# Patient Record
Sex: FEMALE | Race: ASIAN | NOT HISPANIC OR LATINO
[De-identification: names, ages, dates, MRNs, and addresses within clinical notes are randomized per-mention and may not be internally consistent; named-entity substitution may affect disease eponyms.]

---

## 2017-01-11 ENCOUNTER — APPOINTMENT (OUTPATIENT)
Dept: CARDIOLOGY | Facility: CLINIC | Age: 67
End: 2017-01-11

## 2017-01-11 VITALS
WEIGHT: 130 LBS | HEART RATE: 82 BPM | SYSTOLIC BLOOD PRESSURE: 125 MMHG | HEIGHT: 63 IN | RESPIRATION RATE: 18 BRPM | DIASTOLIC BLOOD PRESSURE: 61 MMHG | BODY MASS INDEX: 23.04 KG/M2

## 2017-01-17 ENCOUNTER — APPOINTMENT (OUTPATIENT)
Dept: CARDIOLOGY | Facility: CLINIC | Age: 67
End: 2017-01-17

## 2017-01-25 ENCOUNTER — APPOINTMENT (OUTPATIENT)
Dept: CARDIOLOGY | Facility: CLINIC | Age: 67
End: 2017-01-25

## 2017-01-25 VITALS
RESPIRATION RATE: 14 BRPM | HEIGHT: 62 IN | WEIGHT: 132 LBS | DIASTOLIC BLOOD PRESSURE: 77 MMHG | OXYGEN SATURATION: 98 % | HEART RATE: 75 BPM | BODY MASS INDEX: 24.29 KG/M2 | SYSTOLIC BLOOD PRESSURE: 134 MMHG

## 2017-03-15 ENCOUNTER — APPOINTMENT (OUTPATIENT)
Dept: CARDIOLOGY | Facility: CLINIC | Age: 67
End: 2017-03-15

## 2017-04-06 ENCOUNTER — APPOINTMENT (OUTPATIENT)
Dept: CARDIOLOGY | Facility: CLINIC | Age: 67
End: 2017-04-06

## 2017-04-06 VITALS
HEIGHT: 64 IN | SYSTOLIC BLOOD PRESSURE: 129 MMHG | OXYGEN SATURATION: 92 % | DIASTOLIC BLOOD PRESSURE: 70 MMHG | HEART RATE: 85 BPM | RESPIRATION RATE: 14 BRPM | TEMPERATURE: 98.3 F | BODY MASS INDEX: 23.39 KG/M2 | WEIGHT: 137 LBS

## 2017-05-24 ENCOUNTER — APPOINTMENT (OUTPATIENT)
Dept: CARDIOLOGY | Facility: CLINIC | Age: 67
End: 2017-05-24

## 2017-05-24 VITALS
HEIGHT: 64 IN | RESPIRATION RATE: 15 BRPM | HEART RATE: 86 BPM | DIASTOLIC BLOOD PRESSURE: 65 MMHG | WEIGHT: 131 LBS | SYSTOLIC BLOOD PRESSURE: 131 MMHG | BODY MASS INDEX: 22.36 KG/M2

## 2017-05-24 RX ORDER — AMMONIUM LACTATE 12 %
12 CREAM (GRAM) TOPICAL
Qty: 385 | Refills: 0 | Status: ACTIVE | COMMUNITY
Start: 2017-01-04

## 2017-05-24 RX ORDER — DEXLANSOPRAZOLE 30 MG/1
30 CAPSULE, DELAYED RELEASE ORAL
Qty: 30 | Refills: 0 | Status: ACTIVE | COMMUNITY
Start: 2017-01-24

## 2017-05-24 RX ORDER — LIDOCAINE 5 G/100G
5 OINTMENT TOPICAL
Qty: 100 | Refills: 0 | Status: ACTIVE | COMMUNITY
Start: 2017-01-04

## 2017-05-24 RX ORDER — DEXAMETHASONE 0.5 MG/5ML
0.5 ELIXIR ORAL
Qty: 474 | Refills: 0 | Status: ACTIVE | COMMUNITY
Start: 2017-03-24

## 2017-05-24 RX ORDER — ERYTHROMYCIN 5 MG/G
5 OINTMENT OPHTHALMIC
Qty: 4 | Refills: 0 | Status: ACTIVE | COMMUNITY
Start: 2017-01-18

## 2017-05-24 RX ORDER — BUDESONIDE AND FORMOTEROL FUMARATE DIHYDRATE 160; 4.5 UG/1; UG/1
160-4.5 AEROSOL RESPIRATORY (INHALATION)
Qty: 10 | Refills: 0 | Status: ACTIVE | COMMUNITY
Start: 2017-05-16

## 2017-05-24 RX ORDER — MONTELUKAST 10 MG/1
10 TABLET, FILM COATED ORAL
Qty: 30 | Refills: 0 | Status: ACTIVE | COMMUNITY
Start: 2017-05-16

## 2017-05-24 RX ORDER — BIMATOPROST 0.1 MG/ML
0.01 SOLUTION/ DROPS OPHTHALMIC
Qty: 2 | Refills: 0 | Status: ACTIVE | COMMUNITY
Start: 2017-01-18

## 2017-08-10 ENCOUNTER — APPOINTMENT (OUTPATIENT)
Dept: CARDIOLOGY | Facility: CLINIC | Age: 67
End: 2017-08-10
Payer: MEDICARE

## 2017-08-10 ENCOUNTER — NON-APPOINTMENT (OUTPATIENT)
Age: 67
End: 2017-08-10

## 2017-08-10 VITALS
TEMPERATURE: 98.7 F | OXYGEN SATURATION: 95 % | BODY MASS INDEX: 22.49 KG/M2 | SYSTOLIC BLOOD PRESSURE: 138 MMHG | WEIGHT: 131 LBS | DIASTOLIC BLOOD PRESSURE: 58 MMHG | RESPIRATION RATE: 15 BRPM | HEART RATE: 71 BPM

## 2017-08-10 PROCEDURE — 93000 ELECTROCARDIOGRAM COMPLETE: CPT

## 2017-08-10 PROCEDURE — 99496 TRANSJ CARE MGMT HIGH F2F 7D: CPT

## 2017-08-10 RX ORDER — PANCRELIPASE 36000; 180000; 114000 [USP'U]/1; [USP'U]/1; [USP'U]/1
36000-114000 CAPSULE, DELAYED RELEASE PELLETS ORAL
Qty: 100 | Refills: 0 | Status: ACTIVE | COMMUNITY
Start: 2017-06-26

## 2017-08-10 RX ORDER — ATORVASTATIN CALCIUM 10 MG/1
10 TABLET, FILM COATED ORAL
Qty: 30 | Refills: 0 | Status: ACTIVE | COMMUNITY
Start: 2017-06-22

## 2017-08-10 RX ORDER — PANTOPRAZOLE 40 MG/1
40 TABLET, DELAYED RELEASE ORAL
Qty: 30 | Refills: 0 | Status: ACTIVE | COMMUNITY
Start: 2017-08-07

## 2017-08-10 RX ORDER — LEVOFLOXACIN 500 MG/1
500 TABLET, FILM COATED ORAL
Qty: 6 | Refills: 0 | Status: ACTIVE | COMMUNITY
Start: 2017-08-07

## 2017-09-27 ENCOUNTER — APPOINTMENT (OUTPATIENT)
Dept: CARDIOLOGY | Facility: CLINIC | Age: 67
End: 2017-09-27
Payer: MEDICARE

## 2017-09-27 VITALS
WEIGHT: 130 LBS | DIASTOLIC BLOOD PRESSURE: 58 MMHG | SYSTOLIC BLOOD PRESSURE: 144 MMHG | HEART RATE: 67 BPM | RESPIRATION RATE: 12 BRPM | HEIGHT: 64 IN | BODY MASS INDEX: 22.2 KG/M2

## 2017-09-27 PROCEDURE — 99364: CPT

## 2017-09-27 PROCEDURE — 99215 OFFICE O/P EST HI 40 MIN: CPT

## 2017-09-27 PROCEDURE — 93000 ELECTROCARDIOGRAM COMPLETE: CPT

## 2017-12-21 ENCOUNTER — APPOINTMENT (OUTPATIENT)
Dept: CARDIOLOGY | Facility: CLINIC | Age: 67
End: 2017-12-21

## 2018-01-04 ENCOUNTER — APPOINTMENT (OUTPATIENT)
Dept: CARDIOLOGY | Facility: CLINIC | Age: 68
End: 2018-01-04

## 2018-01-10 ENCOUNTER — APPOINTMENT (OUTPATIENT)
Dept: CARDIOLOGY | Facility: CLINIC | Age: 68
End: 2018-01-10
Payer: MEDICARE

## 2018-01-10 VITALS
WEIGHT: 132 LBS | SYSTOLIC BLOOD PRESSURE: 132 MMHG | BODY MASS INDEX: 22.53 KG/M2 | HEART RATE: 72 BPM | DIASTOLIC BLOOD PRESSURE: 65 MMHG | RESPIRATION RATE: 15 BRPM | HEIGHT: 64 IN

## 2018-01-10 PROCEDURE — 99215 OFFICE O/P EST HI 40 MIN: CPT

## 2018-01-10 RX ORDER — ENOXAPARIN SODIUM 100 MG/ML
80 INJECTION SUBCUTANEOUS
Qty: 16 | Refills: 0 | Status: ACTIVE | COMMUNITY
Start: 2017-12-07

## 2018-01-10 RX ORDER — WARFARIN 4 MG/1
4 TABLET ORAL
Qty: 7 | Refills: 0 | Status: ACTIVE | COMMUNITY
Start: 2017-12-07

## 2018-01-25 ENCOUNTER — APPOINTMENT (OUTPATIENT)
Dept: CARDIOLOGY | Facility: CLINIC | Age: 68
End: 2018-01-25

## 2018-02-01 ENCOUNTER — APPOINTMENT (OUTPATIENT)
Dept: CARDIOLOGY | Facility: CLINIC | Age: 68
End: 2018-02-01
Payer: MEDICARE

## 2018-02-01 ENCOUNTER — NON-APPOINTMENT (OUTPATIENT)
Age: 68
End: 2018-02-01

## 2018-02-01 VITALS
SYSTOLIC BLOOD PRESSURE: 148 MMHG | HEART RATE: 75 BPM | TEMPERATURE: 98.1 F | DIASTOLIC BLOOD PRESSURE: 69 MMHG | OXYGEN SATURATION: 94 % | WEIGHT: 132 LBS | RESPIRATION RATE: 17 BRPM | BODY MASS INDEX: 22.66 KG/M2

## 2018-02-01 PROCEDURE — 93000 ELECTROCARDIOGRAM COMPLETE: CPT

## 2018-02-01 PROCEDURE — 99497 ADVNCD CARE PLAN 30 MIN: CPT

## 2018-02-01 PROCEDURE — 99214 OFFICE O/P EST MOD 30 MIN: CPT

## 2018-02-01 RX ORDER — AMIODARONE HYDROCHLORIDE 200 MG/1
200 TABLET ORAL
Qty: 30 | Refills: 0 | Status: ACTIVE | COMMUNITY
Start: 2018-01-24

## 2018-04-11 ENCOUNTER — APPOINTMENT (OUTPATIENT)
Dept: CARDIOLOGY | Facility: CLINIC | Age: 68
End: 2018-04-11
Payer: MEDICARE

## 2018-04-11 PROCEDURE — 93000 ELECTROCARDIOGRAM COMPLETE: CPT

## 2018-04-11 PROCEDURE — 99214 OFFICE O/P EST MOD 30 MIN: CPT

## 2018-04-12 VITALS
SYSTOLIC BLOOD PRESSURE: 142 MMHG | DIASTOLIC BLOOD PRESSURE: 75 MMHG | RESPIRATION RATE: 15 BRPM | WEIGHT: 130 LBS | HEIGHT: 64 IN | BODY MASS INDEX: 22.2 KG/M2 | HEART RATE: 64 BPM

## 2018-05-09 ENCOUNTER — APPOINTMENT (OUTPATIENT)
Dept: CARDIOLOGY | Facility: CLINIC | Age: 68
End: 2018-05-09
Payer: MEDICARE

## 2018-05-09 VITALS
WEIGHT: 132 LBS | RESPIRATION RATE: 12 BRPM | BODY MASS INDEX: 22.53 KG/M2 | SYSTOLIC BLOOD PRESSURE: 132 MMHG | HEIGHT: 64 IN | HEART RATE: 74 BPM | DIASTOLIC BLOOD PRESSURE: 65 MMHG

## 2018-05-09 PROCEDURE — 99214 OFFICE O/P EST MOD 30 MIN: CPT

## 2018-09-12 ENCOUNTER — APPOINTMENT (OUTPATIENT)
Dept: CARDIOLOGY | Facility: CLINIC | Age: 68
End: 2018-09-12

## 2018-10-17 ENCOUNTER — APPOINTMENT (OUTPATIENT)
Dept: CARDIOLOGY | Facility: CLINIC | Age: 68
End: 2018-10-17

## 2018-11-07 ENCOUNTER — APPOINTMENT (OUTPATIENT)
Dept: CARDIOLOGY | Facility: CLINIC | Age: 68
End: 2018-11-07

## 2018-11-14 ENCOUNTER — APPOINTMENT (OUTPATIENT)
Dept: CARDIOLOGY | Facility: CLINIC | Age: 68
End: 2018-11-14
Payer: MEDICARE

## 2018-11-14 VITALS
DIASTOLIC BLOOD PRESSURE: 78 MMHG | RESPIRATION RATE: 15 BRPM | HEIGHT: 64 IN | SYSTOLIC BLOOD PRESSURE: 112 MMHG | HEART RATE: 78 BPM | WEIGHT: 129 LBS | BODY MASS INDEX: 22.02 KG/M2

## 2018-11-14 PROCEDURE — 93000 ELECTROCARDIOGRAM COMPLETE: CPT

## 2018-11-14 PROCEDURE — 99214 OFFICE O/P EST MOD 30 MIN: CPT

## 2018-11-14 NOTE — PHYSICAL EXAM
[General Appearance - Well Developed] : well developed [Normal Appearance] : normal appearance [Well Groomed] : well groomed [General Appearance - Well Nourished] : well nourished [No Deformities] : no deformities [General Appearance - In No Acute Distress] : no acute distress [Normal Conjunctiva] : the conjunctiva exhibited no abnormalities [Eyelids - No Xanthelasma] : the eyelids demonstrated no xanthelasmas [Normal Oral Mucosa] : normal oral mucosa [No Oral Pallor] : no oral pallor [No Oral Cyanosis] : no oral cyanosis [Normal Jugular Venous A Waves Present] : normal jugular venous A waves present [Normal Jugular Venous V Waves Present] : normal jugular venous V waves present [No Jugular Venous Chirinos A Waves] : no jugular venous chirinos A waves [Respiration, Rhythm And Depth] : normal respiratory rhythm and effort [Exaggerated Use Of Accessory Muscles For Inspiration] : no accessory muscle use [Auscultation Breath Sounds / Voice Sounds] : lungs were clear to auscultation bilaterally [Heart Rate And Rhythm] : heart rate and rhythm were normal [Murmurs] : no murmurs present [Abdomen Soft] : soft [Abdomen Tenderness] : non-tender [Abdomen Mass (___ Cm)] : no abdominal mass palpated [Abnormal Walk] : normal gait [Nail Clubbing] : no clubbing of the fingernails [Cyanosis, Localized] : no localized cyanosis [Petechial Hemorrhages (___cm)] : no petechial hemorrhages [FreeTextEntry1] : trace edema bilaterally  [Skin Color & Pigmentation] : normal skin color and pigmentation [] : no rash [No Venous Stasis] : no venous stasis [Skin Lesions] : no skin lesions [No Skin Ulcers] : no skin ulcer [No Xanthoma] : no  xanthoma was observed [Oriented To Time, Place, And Person] : oriented to person, place, and time [Affect] : the affect was normal [Mood] : the mood was normal [No Anxiety] : not feeling anxious

## 2018-11-14 NOTE — REASON FOR VISIT
[Follow-Up - Clinic] : a clinic follow-up of [Atrial Fibrillation] : atrial fibrillation [Coronary Artery Disease] : coronary artery disease [Hypertension] : hypertension [Prosthetic Valve] : a prosthetic valve [FreeTextEntry1] : 67 F HTN MVR AVR s/p reop bioprosthetic valve TV ring AF for f/u with ATach AF.

## 2018-11-14 NOTE — REVIEW OF SYSTEMS
[Shortness Of Breath] : shortness of breath [Dyspnea on exertion] : dyspnea during exertion [Palpitations] : palpitations [Negative] : Heme/Lymph

## 2018-11-14 NOTE — DISCUSSION/SUMMARY
[FreeTextEntry1] : 67 F HTN hyperlipidemia with AFMVR AVR s/p reop bioprosthetic valve TV ring AF for f/u with ATach AF. \par FOR ASH DCCV with EP.\par Continue AC.\par Rate is controlled. \par Continue meds for HTN.

## 2019-01-16 ENCOUNTER — APPOINTMENT (OUTPATIENT)
Dept: CARDIOLOGY | Facility: CLINIC | Age: 69
End: 2019-01-16

## 2019-02-06 ENCOUNTER — APPOINTMENT (OUTPATIENT)
Dept: CARDIOLOGY | Facility: CLINIC | Age: 69
End: 2019-02-06

## 2019-02-13 ENCOUNTER — APPOINTMENT (OUTPATIENT)
Dept: CARDIOLOGY | Facility: CLINIC | Age: 69
End: 2019-02-13
Payer: MEDICARE

## 2019-02-13 VITALS
HEART RATE: 85 BPM | RESPIRATION RATE: 12 BRPM | SYSTOLIC BLOOD PRESSURE: 116 MMHG | HEIGHT: 64 IN | BODY MASS INDEX: 22.2 KG/M2 | WEIGHT: 130 LBS | DIASTOLIC BLOOD PRESSURE: 52 MMHG

## 2019-02-13 PROCEDURE — 93000 ELECTROCARDIOGRAM COMPLETE: CPT

## 2019-02-13 PROCEDURE — 99214 OFFICE O/P EST MOD 30 MIN: CPT

## 2019-02-13 NOTE — DISCUSSION/SUMMARY
[FreeTextEntry1] : 68 F  HTN hyperlipidemia with AFMVR AVR s/p reop bioprosthetic valve TV ring AF for f/u with ATach AF found to have thrombus MICHELLE on ASH.\par Ablation cancelled for now.\par Continue coumadin and f/u PMD for INR check.\par Continue Pulm f/u.\par Continue meds for HTN and hyperlipidemia.\par

## 2019-02-13 NOTE — HISTORY OF PRESENT ILLNESS
[FreeTextEntry1] : 1. HTN: on medications but taken off BB. She is compliant with medications.\par 2. Hyperlipidemia: on statin, no SE noted. \par 3. AF: she was recently admitted to [facility] for 11 days due to a combination of PNA and AF Aflutter in 2017. She underwent eval by EP for recurrent atrial arrhythmias and underwent DCCV on 1/10/18.She was then noted to be in aflutter with bradycardia. She was then scheduled for ablation but was found to have a MICHELLE thrombus on ASH. She denies CP. \par 4. MVR/ AVR: No new changes. ET is limited by SOB which is chronic.\par

## 2019-02-13 NOTE — REASON FOR VISIT
[Follow-Up - Clinic] : a clinic follow-up of [Atrial Fibrillation] : atrial fibrillation [Hypertension] : hypertension [Pacemaker Evaluation] : pacemaker ~T evaluation ~C was performed [FreeTextEntry1] : 68  F HTN MVR AVR s/p reop bioprosthetic valve TV ring AF for f/u with ATach AF. \par

## 2019-04-03 ENCOUNTER — APPOINTMENT (OUTPATIENT)
Dept: CARDIOLOGY | Facility: CLINIC | Age: 69
End: 2019-04-03

## 2019-07-10 ENCOUNTER — APPOINTMENT (OUTPATIENT)
Dept: CARDIOLOGY | Facility: CLINIC | Age: 69
End: 2019-07-10
Payer: MEDICARE

## 2019-07-10 VITALS
BODY MASS INDEX: 22.36 KG/M2 | SYSTOLIC BLOOD PRESSURE: 126 MMHG | HEIGHT: 64 IN | DIASTOLIC BLOOD PRESSURE: 70 MMHG | HEART RATE: 75 BPM | WEIGHT: 131 LBS | RESPIRATION RATE: 15 BRPM

## 2019-07-10 PROCEDURE — 99214 OFFICE O/P EST MOD 30 MIN: CPT

## 2019-07-10 NOTE — REASON FOR VISIT
[Follow-Up - Clinic] : a clinic follow-up of [Anticoagulation] : anticoagulation [Atrial Fibrillation] : atrial fibrillation [Dyspnea] : dyspnea [Hypertension] : hypertension [Prosthetic Valve] : a prosthetic valve [FreeTextEntry1] : 68 F HTN MVR AVR s/p reop bioprosthetic valve TV ring AF for f/u with ATach AF. \par

## 2019-07-10 NOTE — DISCUSSION/SUMMARY
[FreeTextEntry1] : 68 F HTN hyperlipidemia hx of MVR TVR AF asthma for f/u.\par Continue meds for HTN.\par [place] to monitor valve function.\par F/u Pulm for SOB.\par EP f/u for AF.

## 2019-07-10 NOTE — HISTORY OF PRESENT ILLNESS
[FreeTextEntry1] : 1. HTN: on medications, compliant with medications.\par 2. Hyperlipidemia: on statin, no muscle pain is noted.\par 3. AF s/p DCCV: due to persistent symptoms, she was then scheduled for ablation but was found to have a MICHELLE thrombus on ASH. She denies CP. Her SOB is chronic. Her ET is stable.\par 4. MVR/ AVR: No new changes. ET is limited by SOB which is chronic.\par

## 2019-07-10 NOTE — REVIEW OF SYSTEMS
[Dyspnea on exertion] : dyspnea during exertion [Palpitations] : palpitations [Shortness Of Breath] : shortness of breath [Negative] : Heme/Lymph

## 2019-07-10 NOTE — PHYSICAL EXAM
[General Appearance - Well Developed] : well developed [Normal Appearance] : normal appearance [General Appearance - Well Nourished] : well nourished [Well Groomed] : well groomed [Normal Conjunctiva] : the conjunctiva exhibited no abnormalities [No Deformities] : no deformities [General Appearance - In No Acute Distress] : no acute distress [Eyelids - No Xanthelasma] : the eyelids demonstrated no xanthelasmas [Normal Oral Mucosa] : normal oral mucosa [No Oral Pallor] : no oral pallor [No Oral Cyanosis] : no oral cyanosis [Normal Jugular Venous A Waves Present] : normal jugular venous A waves present [Normal Jugular Venous V Waves Present] : normal jugular venous V waves present [No Jugular Venous Chirinos A Waves] : no jugular venous chirinos A waves [Respiration, Rhythm And Depth] : normal respiratory rhythm and effort [Heart Rate And Rhythm] : heart rate and rhythm were normal [Auscultation Breath Sounds / Voice Sounds] : lungs were clear to auscultation bilaterally [Exaggerated Use Of Accessory Muscles For Inspiration] : no accessory muscle use [Murmurs] : no murmurs present [Abdomen Tenderness] : non-tender [Abdomen Soft] : soft [Abnormal Walk] : normal gait [Abdomen Mass (___ Cm)] : no abdominal mass palpated [Nail Clubbing] : no clubbing of the fingernails [Cyanosis, Localized] : no localized cyanosis [FreeTextEntry1] : trace edema bilaterally  [Petechial Hemorrhages (___cm)] : no petechial hemorrhages [Skin Color & Pigmentation] : normal skin color and pigmentation [] : no rash [No Venous Stasis] : no venous stasis [No Skin Ulcers] : no skin ulcer [Skin Lesions] : no skin lesions [No Xanthoma] : no  xanthoma was observed [Affect] : the affect was normal [Mood] : the mood was normal [Oriented To Time, Place, And Person] : oriented to person, place, and time [No Anxiety] : not feeling anxious

## 2019-09-18 ENCOUNTER — APPOINTMENT (OUTPATIENT)
Dept: CARDIOLOGY | Facility: CLINIC | Age: 69
End: 2019-09-18

## 2020-02-12 ENCOUNTER — APPOINTMENT (OUTPATIENT)
Dept: CARDIOLOGY | Facility: CLINIC | Age: 70
End: 2020-02-12
Payer: MEDICARE

## 2020-02-12 VITALS
SYSTOLIC BLOOD PRESSURE: 132 MMHG | HEIGHT: 64 IN | BODY MASS INDEX: 22.53 KG/M2 | RESPIRATION RATE: 12 BRPM | DIASTOLIC BLOOD PRESSURE: 88 MMHG | HEART RATE: 96 BPM | WEIGHT: 132 LBS

## 2020-02-12 PROCEDURE — 99214 OFFICE O/P EST MOD 30 MIN: CPT

## 2020-02-12 PROCEDURE — 93000 ELECTROCARDIOGRAM COMPLETE: CPT

## 2020-02-12 RX ORDER — CARVEDILOL 12.5 MG/1
12.5 TABLET, FILM COATED ORAL
Refills: 0 | Status: ACTIVE | COMMUNITY
Start: 2020-02-12

## 2020-02-12 RX ORDER — WARFARIN 2 MG/1
2 TABLET ORAL
Qty: 30 | Refills: 0 | Status: DISCONTINUED | COMMUNITY
Start: 2017-08-07 | End: 2020-02-12

## 2020-02-12 RX ORDER — WARFARIN 3 MG/1
3 TABLET ORAL
Refills: 0 | Status: ACTIVE | COMMUNITY
Start: 2020-02-12

## 2020-02-12 NOTE — REASON FOR VISIT
Epidermal Closure: simple interrupted [Follow-Up - Clinic] : a clinic follow-up of [Atrial Fibrillation] : atrial fibrillation [Dyspnea] : dyspnea [Prosthetic Valve] : a prosthetic valve [Hypertension] : hypertension [FreeTextEntry1] : 69 F HTN MVR AVR s/p reop bioprosthetic valve TV ring AF for f/u with ATach AF. \par [phone]\par Dr. Day

## 2020-02-12 NOTE — HISTORY OF PRESENT ILLNESS
[FreeTextEntry1] : 1. HTN: on medications, control improved.\par 2. Hyperlipidemia: on statin, no muscle pain is noted. No SE noted. \par 3. AF s/p DCCV: due to persistent symptoms, she was then scheduled for ablation but was found to have a MICHELLE thrombus on ASH. Patient denies CP.\par She has chronic SOB that is attributed to underlying AF and diastolic dysfunction. She was seen by Pulm. Her echo shows normal LVEF. \par 4. MVR/ AVR: No new changes. ET is limited by SOB which is chronic. See above for results. \par

## 2020-02-12 NOTE — DISCUSSION/SUMMARY
[FreeTextEntry1] : 69 HTN MVR AVR s/p reop bioprosthetic valve TV ring AF for f/u with ATach AF. \par Patient wants to defer echo and holter testing.\par F/u INR. \par Continue coumadin.\par Continue medications for HTN.\par

## 2020-02-12 NOTE — PHYSICAL EXAM
[General Appearance - Well Developed] : well developed [Well Groomed] : well groomed [General Appearance - Well Nourished] : well nourished [Normal Appearance] : normal appearance [General Appearance - In No Acute Distress] : no acute distress [No Deformities] : no deformities [Normal Conjunctiva] : the conjunctiva exhibited no abnormalities [Eyelids - No Xanthelasma] : the eyelids demonstrated no xanthelasmas [Normal Oral Mucosa] : normal oral mucosa [No Oral Pallor] : no oral pallor [Normal Jugular Venous A Waves Present] : normal jugular venous A waves present [No Oral Cyanosis] : no oral cyanosis [Normal Jugular Venous V Waves Present] : normal jugular venous V waves present [No Jugular Venous Chirinos A Waves] : no jugular venous chirinos A waves [Respiration, Rhythm And Depth] : normal respiratory rhythm and effort [Exaggerated Use Of Accessory Muscles For Inspiration] : no accessory muscle use [Heart Rate And Rhythm] : heart rate and rhythm were normal [Auscultation Breath Sounds / Voice Sounds] : lungs were clear to auscultation bilaterally [Murmurs] : no murmurs present [Abdomen Soft] : soft [Abdomen Tenderness] : non-tender [Abnormal Walk] : normal gait [Abdomen Mass (___ Cm)] : no abdominal mass palpated [Nail Clubbing] : no clubbing of the fingernails [Petechial Hemorrhages (___cm)] : no petechial hemorrhages [Cyanosis, Localized] : no localized cyanosis [Skin Color & Pigmentation] : normal skin color and pigmentation [No Venous Stasis] : no venous stasis [] : no rash [Skin Lesions] : no skin lesions [Oriented To Time, Place, And Person] : oriented to person, place, and time [No Skin Ulcers] : no skin ulcer [No Xanthoma] : no  xanthoma was observed [Affect] : the affect was normal [No Anxiety] : not feeling anxious [Mood] : the mood was normal [FreeTextEntry1] : trace edema bilaterally

## 2020-03-11 ENCOUNTER — APPOINTMENT (OUTPATIENT)
Dept: CARDIOLOGY | Facility: CLINIC | Age: 70
End: 2020-03-11

## 2020-07-22 ENCOUNTER — APPOINTMENT (OUTPATIENT)
Dept: CARDIOLOGY | Facility: CLINIC | Age: 70
End: 2020-07-22
Payer: MEDICARE

## 2020-07-22 VITALS
DIASTOLIC BLOOD PRESSURE: 41 MMHG | HEART RATE: 85 BPM | SYSTOLIC BLOOD PRESSURE: 128 MMHG | RESPIRATION RATE: 16 BRPM | HEIGHT: 64 IN | BODY MASS INDEX: 22.36 KG/M2 | WEIGHT: 131 LBS

## 2020-07-22 PROCEDURE — 99214 OFFICE O/P EST MOD 30 MIN: CPT

## 2020-07-22 PROCEDURE — 93000 ELECTROCARDIOGRAM COMPLETE: CPT

## 2020-07-22 NOTE — HISTORY OF PRESENT ILLNESS
[FreeTextEntry1] :  \par 1. HTN: on medications, no SE noted. \par 2. Hyperlipidemia: on statin, lipid profile is followed by PMD. \par 3. AF s/p DCCV: due to persistent symptoms, she was then scheduled for ablation but was found to have a MICHELLE thrombus on ASH. Patient denies CP. She has chronic SOB with exertion. She denies cough or fevers. \par SOB that is attributed to underlying AF and diastolic dysfunction. She was seen by Pulm.  \par 4. MVR/ AVR: No new changes. ET is limited by SOB which is chronic.  She has mild leg edema. She wants to have a colonoscopy. \par \par  \par Cardiology Summary\par \par EK20 AF 84 bpm nl axis ST changes \par

## 2020-07-22 NOTE — PHYSICAL EXAM
[General Appearance - Well Developed] : well developed [Well Groomed] : well groomed [Normal Appearance] : normal appearance [General Appearance - Well Nourished] : well nourished [No Deformities] : no deformities [General Appearance - In No Acute Distress] : no acute distress [Eyelids - No Xanthelasma] : the eyelids demonstrated no xanthelasmas [Normal Oral Mucosa] : normal oral mucosa [Normal Conjunctiva] : the conjunctiva exhibited no abnormalities [No Oral Pallor] : no oral pallor [No Oral Cyanosis] : no oral cyanosis [Normal Jugular Venous A Waves Present] : normal jugular venous A waves present [Normal Jugular Venous V Waves Present] : normal jugular venous V waves present [No Jugular Venous Chirinos A Waves] : no jugular venous chirinos A waves [Respiration, Rhythm And Depth] : normal respiratory rhythm and effort [Exaggerated Use Of Accessory Muscles For Inspiration] : no accessory muscle use [Auscultation Breath Sounds / Voice Sounds] : lungs were clear to auscultation bilaterally [Murmurs] : no murmurs present [Heart Rate And Rhythm] : heart rate and rhythm were normal [Abdomen Tenderness] : non-tender [Abdomen Soft] : soft [Abnormal Walk] : normal gait [Abdomen Mass (___ Cm)] : no abdominal mass palpated [Petechial Hemorrhages (___cm)] : no petechial hemorrhages [Nail Clubbing] : no clubbing of the fingernails [Cyanosis, Localized] : no localized cyanosis [FreeTextEntry1] : trace edema bilaterally  [] : no rash [Skin Color & Pigmentation] : normal skin color and pigmentation [No Skin Ulcers] : no skin ulcer [Skin Lesions] : no skin lesions [No Venous Stasis] : no venous stasis [No Xanthoma] : no  xanthoma was observed [Affect] : the affect was normal [Oriented To Time, Place, And Person] : oriented to person, place, and time [No Anxiety] : not feeling anxious [Mood] : the mood was normal

## 2020-07-22 NOTE — DISCUSSION/SUMMARY
[FreeTextEntry1] : 69 F HTN MVR AVR s/p reop bioprosthetic valve TV ring AF for f/u with ATach AF with SOB.\par CHECK ECHOCARDIOGRAM.\par Continue medications for HTN.\par Continue AC.\par PATIENT CANNOT INTERRUPT AC DUE TO HX OF MICHELLE THROMBUS. IF PATIENT NEEDS COLONOSCOPY, RECOMMEND INPATIENT ADMISSION WITH BRIDGING WITH HEPARIN.

## 2020-07-22 NOTE — REASON FOR VISIT
[Follow-Up - Clinic] : a clinic follow-up of [Atrial Fibrillation] : atrial fibrillation [Hypertension] : hypertension [Prosthetic Valve] : a prosthetic valve [FreeTextEntry1] : 69 F HTN MVR AVR s/p reop bioprosthetic valve TV ring AF for f/u with ATach AF. \par [phone]\par Dr. Day \par  \par

## 2020-07-22 NOTE — REVIEW OF SYSTEMS
[Dyspnea on exertion] : dyspnea during exertion [Shortness Of Breath] : shortness of breath [Palpitations] : palpitations [Negative] : Heme/Lymph

## 2020-07-30 ENCOUNTER — APPOINTMENT (OUTPATIENT)
Dept: CARDIOLOGY | Facility: CLINIC | Age: 70
End: 2020-07-30
Payer: MEDICARE

## 2020-07-30 VITALS
DIASTOLIC BLOOD PRESSURE: 73 MMHG | RESPIRATION RATE: 16 BRPM | OXYGEN SATURATION: 92 % | SYSTOLIC BLOOD PRESSURE: 138 MMHG | TEMPERATURE: 97 F | HEART RATE: 91 BPM

## 2020-07-30 PROCEDURE — 93306 TTE W/DOPPLER COMPLETE: CPT

## 2020-09-02 ENCOUNTER — APPOINTMENT (OUTPATIENT)
Dept: CARDIOLOGY | Facility: CLINIC | Age: 70
End: 2020-09-02
Payer: MEDICARE

## 2020-09-02 VITALS
DIASTOLIC BLOOD PRESSURE: 92 MMHG | RESPIRATION RATE: 12 BRPM | HEART RATE: 78 BPM | SYSTOLIC BLOOD PRESSURE: 112 MMHG | HEIGHT: 64 IN | WEIGHT: 130 LBS | BODY MASS INDEX: 22.2 KG/M2

## 2020-09-02 PROCEDURE — 99214 OFFICE O/P EST MOD 30 MIN: CPT

## 2020-09-02 PROCEDURE — 93000 ELECTROCARDIOGRAM COMPLETE: CPT

## 2020-09-02 NOTE — REASON FOR VISIT
[Follow-Up - Clinic] : a clinic follow-up of [Atrial Fibrillation] : atrial fibrillation [Coronary Artery Disease] : coronary artery disease [Hyperlipidemia] : hyperlipidemia [Hypertension] : hypertension [Prosthetic Valve] : a prosthetic valve [FreeTextEntry1] : 69 F HTN MVR AVR s/p reop bioprosthetic valve TV ring AF for f/u with ATach AF. \par [phone]\par Dr. Day \par  \par

## 2020-09-02 NOTE — HISTORY OF PRESENT ILLNESS
[FreeTextEntry1] :  \par 1. HTN: on medications, controlled. \par 2. Hyperlipidemia: on statin, no muscle pain noted.  \par 3. AF s/p DCCV: due to persistent symptoms, she was then scheduled for ablation but was found to have a MICHELLE thrombus on ASH. Patient denies CP. She has chronic SOB with exertion.  \par SOB that is attributed to underlying AF and diastolic dysfunction. She was seen by Pulm. \par 4. MVR/ AVR: No new changes. ET is limited by SOB which is chronic. She has mild leg edema. She wants to have a colonoscopy. \par She had an echo that showed normal valve function with severe pHTN.\par  \par Cardiology Summary\par \par EK20 AF 84 bpm nl axis ST changes

## 2020-09-02 NOTE — DISCUSSION/SUMMARY
[FreeTextEntry1] : 69 F HTN MVR AVR s/p reop bioprosthetic valve TV ring AF for f/u with ATach AF. \par PATIENT FOUND TO HAVE SEVERE PULMONARY HTN ON ECHO.\par WILL REFER TO PULM.\par Continue medications for HTN.\par Continue to monitor valve function.\par Continue AC.\par Continue rate control.\par

## 2020-09-20 DIAGNOSIS — Z01.818 ENCOUNTER FOR OTHER PREPROCEDURAL EXAMINATION: ICD-10-CM

## 2020-09-21 ENCOUNTER — APPOINTMENT (OUTPATIENT)
Dept: DISASTER EMERGENCY | Facility: CLINIC | Age: 70
End: 2020-09-21

## 2020-09-22 LAB — SARS-COV-2 N GENE NPH QL NAA+PROBE: NOT DETECTED

## 2020-09-25 ENCOUNTER — LABORATORY RESULT (OUTPATIENT)
Age: 70
End: 2020-09-25

## 2020-09-25 ENCOUNTER — APPOINTMENT (OUTPATIENT)
Dept: PULMONOLOGY | Facility: CLINIC | Age: 70
End: 2020-09-25
Payer: MEDICARE

## 2020-09-25 ENCOUNTER — MED ADMIN CHARGE (OUTPATIENT)
Age: 70
End: 2020-09-25

## 2020-09-25 VITALS
HEART RATE: 89 BPM | TEMPERATURE: 97.4 F | HEIGHT: 65 IN | RESPIRATION RATE: 15 BRPM | OXYGEN SATURATION: 90 % | DIASTOLIC BLOOD PRESSURE: 81 MMHG | WEIGHT: 128 LBS | SYSTOLIC BLOOD PRESSURE: 156 MMHG | BODY MASS INDEX: 21.33 KG/M2

## 2020-09-25 VITALS — TEMPERATURE: 98 F | HEIGHT: 65 IN | HEART RATE: 89 BPM | BODY MASS INDEX: 21.33 KG/M2 | WEIGHT: 128 LBS

## 2020-09-25 DIAGNOSIS — I27.20 PULMONARY HYPERTENSION, UNSPECIFIED: ICD-10-CM

## 2020-09-25 LAB
25(OH)D3 SERPL-MCNC: 28.8 NG/ML
A1AT SERPL-MCNC: 154 MG/DL
ALBUMIN SERPL ELPH-MCNC: 4.6 G/DL
ALP BLD-CCNC: 112 U/L
ALT SERPL-CCNC: 24 U/L
ANION GAP SERPL CALC-SCNC: 14 MMOL/L
APTT BLD: 42.5 SEC
AST SERPL-CCNC: 35 U/L
BASOPHILS # BLD AUTO: 0.04 K/UL
BASOPHILS NFR BLD AUTO: 0.9 %
BILIRUB SERPL-MCNC: 1 MG/DL
BUN SERPL-MCNC: 13 MG/DL
CALCIUM SERPL-MCNC: 9.2 MG/DL
CALCIUM SERPL-MCNC: 9.2 MG/DL
CHLORIDE SERPL-SCNC: 103 MMOL/L
CO2 SERPL-SCNC: 23 MMOL/L
CREAT SERPL-MCNC: 0.51 MG/DL
EOSINOPHIL # BLD AUTO: 0.13 K/UL
EOSINOPHIL NFR BLD AUTO: 3 %
ERYTHROCYTE [SEDIMENTATION RATE] IN BLOOD BY WESTERGREN METHOD: 49 MM/HR
GLUCOSE SERPL-MCNC: 100 MG/DL
HCT VFR BLD CALC: 38 %
HCYS SERPL-MCNC: 8.8 UMOL/L
HGB BLD-MCNC: 12.1 G/DL
IMM GRANULOCYTES NFR BLD AUTO: 0.2 %
INR PPP: 1.98 RATIO
LYMPHOCYTES # BLD AUTO: 1.26 K/UL
LYMPHOCYTES NFR BLD AUTO: 28.8 %
MAN DIFF?: NORMAL
MCHC RBC-ENTMCNC: 28.3 PG
MCHC RBC-ENTMCNC: 31.8 GM/DL
MCV RBC AUTO: 89 FL
MONOCYTES # BLD AUTO: 0.36 K/UL
MONOCYTES NFR BLD AUTO: 8.2 %
NEUTROPHILS # BLD AUTO: 2.57 K/UL
NEUTROPHILS NFR BLD AUTO: 58.9 %
NT-PROBNP SERPL-MCNC: 284 PG/ML
PARATHYROID HORMONE INTACT: 29 PG/ML
PHOSPHATE SERPL-MCNC: 3.2 MG/DL
PLATELET # BLD AUTO: 199 K/UL
POTASSIUM SERPL-SCNC: 4.2 MMOL/L
PROT SERPL-MCNC: 8.2 G/DL
PT BLD: 22.6 SEC
RBC # BLD: 4.27 M/UL
RBC # FLD: 14.6 %
RHEUMATOID FACT SER QL: 24 IU/ML
SODIUM SERPL-SCNC: 140 MMOL/L
T3 SERPL-MCNC: 124 NG/DL
T3FREE SERPL-MCNC: 3.21 PG/ML
T3RU NFR SERPL: 1.1 TBI
T4 FREE SERPL-MCNC: 1.2 NG/DL
TSH SERPL-ACNC: 1.18 UIU/ML
URATE SERPL-MCNC: 3.8 MG/DL
VIT B12 SERPL-MCNC: >2000 PG/ML
WBC # FLD AUTO: 4.37 K/UL

## 2020-09-25 PROCEDURE — 94010 BREATHING CAPACITY TEST: CPT

## 2020-09-25 PROCEDURE — ZZZZZ: CPT

## 2020-09-25 PROCEDURE — 94726 PLETHYSMOGRAPHY LUNG VOLUMES: CPT

## 2020-09-25 PROCEDURE — 96372 THER/PROPH/DIAG INJ SC/IM: CPT

## 2020-09-25 PROCEDURE — 99205 OFFICE O/P NEW HI 60 MIN: CPT | Mod: 25

## 2020-09-25 PROCEDURE — 94729 DIFFUSING CAPACITY: CPT

## 2020-09-25 PROCEDURE — 36415 COLL VENOUS BLD VENIPUNCTURE: CPT

## 2020-09-25 RX ORDER — METHYLPREDNISOLONE 40 MG/ML
40 INJECTION, POWDER, LYOPHILIZED, FOR SOLUTION INTRAMUSCULAR; INTRAVENOUS
Qty: 1 | Refills: 0 | Status: COMPLETED | OUTPATIENT
Start: 2020-09-25

## 2020-09-25 RX ORDER — PREDNISONE 10 MG/1
10 TABLET ORAL
Qty: 60 | Refills: 1 | Status: ACTIVE | COMMUNITY
Start: 2020-09-25 | End: 1900-01-01

## 2020-09-25 RX ADMIN — METHYLPREDNISOLONE SODIUM SUCCINATE 1 MG: 40 INJECTION, POWDER, FOR SOLUTION INTRAMUSCULAR; INTRAVENOUS at 00:00

## 2020-09-25 NOTE — REVIEW OF SYSTEMS
[Dyspnea] : dyspnea [SOB on Exertion] : sob on exertion [Negative] : Endocrine [TextBox_44] : as per hpi [TextBox_69] : wt loss

## 2020-09-25 NOTE — HISTORY OF PRESENT ILLNESS
[TextBox_4] : This letter  is regarding your patient  who  attended pulmonary out patient office today.  I have reviewed  patient's  past history, social history, family history and medication list. I also  reviewed nurse practitioners/ and fellows  notes and assessment and agree with it.  \par The patient was referred by  for pulm htn\par \par 70 yo with h/o Aortic and mitral valve replacement- most recently at [facility] , 6 years ago- has been having progressive dyspnea since then. She has been having wt loss as well. c/o intermitt chest pain. \par PMH of hyperchol, HTN\par \par visit translated by pt son who provided history\par \par ------No history of , fever, chills , rigors, chest pain, or hemoptysis. Questionable history of Raynaud's phenomenon. No h/o significant weight loss in last few months. No history of liver dysfunction , collagen vascular disorder or chronic thromboembolic disease. I would classify the patient's dyspnea as WHO  FUNCTIONAL CLASS II--------\par \par ----Echo  date- 7/2020 est pasp= 79mmHg-----\par ----Pft date---9/2020 restrictive defect, decreased dlco------\par ----Ct scan date-2018 ----atelectasis , small bilat pleural effusions and pleural thickening, cholelithiasis--\par ----Cath date-----2014-------\par \par

## 2020-09-25 NOTE — DISCUSSION/SUMMARY
[FreeTextEntry1] : ---Assessment plan----------The patient has been referred here for further opinion regarding pulmonary problem,\par 68 yo referred for pulm htn h/o valvular heart disease Group IIAND iii   \par CT c/w bronchiectasis\par \par 1) will repeat ct chest non contrast\par 2) needs ct abd/pelvis give wt loss\par 3) s/p medrol 40mg im in office- to start prednisone 20mg taper weekly by 5mg, nebulizer ---sputum cultures--- nebulizer  duonebs- \par 4) bone density\par 5) she is up to date with influenza vac\par 6) labs drawn in our office today\par 7) f/u in 6-8 weeks with EOT\par \par Thanks for allowing  me to participate  in the care of this patient.  Patient at this time  will follow  the above mentioned recommendations and return back for follow up visit. If you have any questions  I can be reached  at #[phone] (beeper)  or  [phone] (office).\par \par Norma Flowers MD, FCCP \par Director, Pulmonary Hypertension Program \par [facility] \par Division of Pulmonary, Critical Care and Sleep Medicine \par  Professor of Medicine \par [employer]\par

## 2020-09-25 NOTE — PHYSICAL EXAM
[No Acute Distress] : no acute distress [Normal Oropharynx] : normal oropharynx [Normal Appearance] : normal appearance [No Neck Mass] : no neck mass [Normal Rate/Rhythm] : normal rate/rhythm [No Murmurs] : no murmurs [No Resp Distress] : no resp distress [No Abnormalities] : no abnormalities [Benign] : benign [Normal Gait] : normal gait [No Clubbing] : no clubbing [No Cyanosis] : no cyanosis [No Edema] : no edema [FROM] : FROM [Normal Color/ Pigmentation] : normal color/ pigmentation [No Focal Deficits] : no focal deficits [Oriented x3] : oriented x3 [Normal Affect] : normal affect [TextBox_54] :  loud s2 [TextBox_68] : bibasilar crackles-

## 2020-09-28 ENCOUNTER — APPOINTMENT (OUTPATIENT)
Dept: DISASTER EMERGENCY | Facility: CLINIC | Age: 70
End: 2020-09-28

## 2020-09-28 LAB
ANA PAT FLD IF-IMP: ABNORMAL
ANACR T: ABNORMAL
CARDIOLIPIN AB SER IA-ACNC: NEGATIVE
CCP AB SER IA-ACNC: <8 UNITS
DEPRECATED KAPPA LC FREE/LAMBDA SER: 1.24 RATIO
ENA SCL70 IGG SER IA-ACNC: 0.5 AL
ENA SS-A AB SER IA-ACNC: <0.2 AL
ENA SS-B AB SER IA-ACNC: <0.2 AL
FOLATE RBC-MCNC: 1056 NG/ML
HCT VFR BLD CALC: 37.6 %
IGA SER QL IEP: 505 MG/DL
IGG SER QL IEP: 1914 MG/DL
IGM SER QL IEP: 87 MG/DL
KAPPA LC CSF-MCNC: 1.75 MG/DL
KAPPA LC SERPL-MCNC: 2.17 MG/DL
M TB IFN-G BLD-IMP: ABNORMAL
QUANTIFERON TB PLUS MITOGEN MINUS NIL: 0.36 IU/ML
QUANTIFERON TB PLUS NIL: 0.08 IU/ML
QUANTIFERON TB PLUS TB1 MINUS NIL: 0.01 IU/ML
QUANTIFERON TB PLUS TB2 MINUS NIL: -0.01 IU/ML
RF+CCP IGG SER-IMP: NEGATIVE
TOTAL IGE SMQN RAST: 10 KU/L

## 2020-09-30 ENCOUNTER — APPOINTMENT (OUTPATIENT)
Dept: CT IMAGING | Facility: CLINIC | Age: 70
End: 2020-09-30
Payer: MEDICARE

## 2020-09-30 ENCOUNTER — OUTPATIENT (OUTPATIENT)
Dept: OUTPATIENT SERVICES | Facility: HOSPITAL | Age: 70
LOS: 1 days | End: 2020-09-30
Payer: MEDICARE

## 2020-09-30 DIAGNOSIS — Z00.8 ENCOUNTER FOR OTHER GENERAL EXAMINATION: ICD-10-CM

## 2020-09-30 PROCEDURE — 71250 CT THORAX DX C-: CPT

## 2020-09-30 PROCEDURE — 74176 CT ABD & PELVIS W/O CONTRAST: CPT | Mod: 26

## 2020-09-30 PROCEDURE — 71250 CT THORAX DX C-: CPT | Mod: 26

## 2020-09-30 PROCEDURE — 74176 CT ABD & PELVIS W/O CONTRAST: CPT

## 2020-10-01 ENCOUNTER — TRANSCRIPTION ENCOUNTER (OUTPATIENT)
Age: 70
End: 2020-10-01

## 2020-10-01 LAB
BACTERIA SPT CULT: ABNORMAL

## 2020-10-01 RX ORDER — LEVOFLOXACIN 500 MG/1
500 TABLET, FILM COATED ORAL DAILY
Qty: 7 | Refills: 0 | Status: ACTIVE | COMMUNITY
Start: 2020-10-01 | End: 1900-01-01

## 2020-10-02 LAB — T4 FREE SERPL DIALY-MCNC: 1.4 NG/DL

## 2020-10-28 ENCOUNTER — APPOINTMENT (OUTPATIENT)
Dept: CARDIOLOGY | Facility: CLINIC | Age: 70
End: 2020-10-28

## 2020-11-05 ENCOUNTER — APPOINTMENT (OUTPATIENT)
Dept: PULMONOLOGY | Facility: CLINIC | Age: 70
End: 2020-11-05
Payer: MEDICARE

## 2020-11-05 VITALS
HEIGHT: 65 IN | OXYGEN SATURATION: 90 % | DIASTOLIC BLOOD PRESSURE: 73 MMHG | WEIGHT: 129 LBS | RESPIRATION RATE: 15 BRPM | SYSTOLIC BLOOD PRESSURE: 155 MMHG | BODY MASS INDEX: 21.49 KG/M2 | HEART RATE: 88 BPM | TEMPERATURE: 97.7 F

## 2020-11-05 DIAGNOSIS — A48.8 OTHER SPECIFIED BACTERIAL DISEASES: ICD-10-CM

## 2020-11-05 DIAGNOSIS — A49.8 OTHER BACTERIAL INFECTIONS OF UNSPECIFIED SITE: ICD-10-CM

## 2020-11-05 PROCEDURE — 99215 OFFICE O/P EST HI 40 MIN: CPT | Mod: 25

## 2020-11-05 PROCEDURE — 36415 COLL VENOUS BLD VENIPUNCTURE: CPT

## 2020-11-06 ENCOUNTER — LABORATORY RESULT (OUTPATIENT)
Age: 70
End: 2020-11-06

## 2020-11-06 LAB
ALBUMIN SERPL ELPH-MCNC: 4.2 G/DL
ALP BLD-CCNC: 114 U/L
ALT SERPL-CCNC: 24 U/L
ANION GAP SERPL CALC-SCNC: 13 MMOL/L
AST SERPL-CCNC: 37 U/L
BASOPHILS # BLD AUTO: 0.04 K/UL
BASOPHILS NFR BLD AUTO: 0.8 %
BILIRUB SERPL-MCNC: 0.8 MG/DL
BUN SERPL-MCNC: 9 MG/DL
CALCIUM SERPL-MCNC: 9.2 MG/DL
CHLORIDE SERPL-SCNC: 104 MMOL/L
CO2 SERPL-SCNC: 22 MMOL/L
CREAT SERPL-MCNC: 0.43 MG/DL
DEPRECATED KAPPA LC FREE/LAMBDA SER: 0.86 RATIO
EOSINOPHIL # BLD AUTO: 0.12 K/UL
EOSINOPHIL NFR BLD AUTO: 2.5 %
GLUCOSE SERPL-MCNC: 92 MG/DL
HCT VFR BLD CALC: 37.9 %
HGB BLD-MCNC: 12 G/DL
IGA SER QL IEP: 495 MG/DL
IGG SER QL IEP: 1682 MG/DL
IGM SER QL IEP: 84 MG/DL
IMM GRANULOCYTES NFR BLD AUTO: 0.2 %
KAPPA LC CSF-MCNC: 2.85 MG/DL
KAPPA LC SERPL-MCNC: 2.45 MG/DL
LYMPHOCYTES # BLD AUTO: 1.45 K/UL
LYMPHOCYTES NFR BLD AUTO: 29.7 %
MAN DIFF?: NORMAL
MCHC RBC-ENTMCNC: 28.4 PG
MCHC RBC-ENTMCNC: 31.7 GM/DL
MCV RBC AUTO: 89.8 FL
MONOCYTES # BLD AUTO: 0.45 K/UL
MONOCYTES NFR BLD AUTO: 9.2 %
NEUTROPHILS # BLD AUTO: 2.81 K/UL
NEUTROPHILS NFR BLD AUTO: 57.6 %
PLATELET # BLD AUTO: 221 K/UL
POTASSIUM SERPL-SCNC: 4.1 MMOL/L
PROT SERPL-MCNC: 7.3 G/DL
RBC # BLD: 4.22 M/UL
RBC # FLD: 14.7 %
SODIUM SERPL-SCNC: 139 MMOL/L
WBC # FLD AUTO: 4.88 K/UL

## 2020-11-09 LAB
M TB IFN-G BLD-IMP: NEGATIVE
QUANTIFERON TB PLUS MITOGEN MINUS NIL: 7.39 IU/ML
QUANTIFERON TB PLUS NIL: 0.27 IU/ML
QUANTIFERON TB PLUS TB1 MINUS NIL: 0.12 IU/ML
QUANTIFERON TB PLUS TB2 MINUS NIL: -0.02 IU/ML

## 2020-11-10 ENCOUNTER — APPOINTMENT (OUTPATIENT)
Dept: RADIOLOGY | Facility: CLINIC | Age: 70
End: 2020-11-10
Payer: MEDICARE

## 2020-11-10 ENCOUNTER — OUTPATIENT (OUTPATIENT)
Dept: OUTPATIENT SERVICES | Facility: HOSPITAL | Age: 70
LOS: 1 days | End: 2020-11-10
Payer: MEDICARE

## 2020-11-10 DIAGNOSIS — A48.8 OTHER SPECIFIED BACTERIAL DISEASES: ICD-10-CM

## 2020-11-10 PROCEDURE — 77080 DXA BONE DENSITY AXIAL: CPT

## 2020-11-10 PROCEDURE — 77080 DXA BONE DENSITY AXIAL: CPT | Mod: 26

## 2020-11-12 ENCOUNTER — NON-APPOINTMENT (OUTPATIENT)
Age: 70
End: 2020-11-12

## 2020-11-18 LAB
ACID FAST STN SPT: NORMAL

## 2020-12-14 ENCOUNTER — APPOINTMENT (OUTPATIENT)
Dept: DISASTER EMERGENCY | Facility: CLINIC | Age: 70
End: 2020-12-14

## 2020-12-16 LAB — SARS-COV-2 N GENE NPH QL NAA+PROBE: NOT DETECTED

## 2020-12-17 ENCOUNTER — APPOINTMENT (OUTPATIENT)
Dept: PULMONOLOGY | Facility: CLINIC | Age: 70
End: 2020-12-17
Payer: MEDICARE

## 2020-12-17 PROCEDURE — 94618 PULMONARY STRESS TESTING: CPT

## 2020-12-18 ENCOUNTER — APPOINTMENT (OUTPATIENT)
Dept: PULMONOLOGY | Facility: CLINIC | Age: 70
End: 2020-12-18

## 2020-12-22 ENCOUNTER — APPOINTMENT (OUTPATIENT)
Dept: PULMONOLOGY | Facility: CLINIC | Age: 70
End: 2020-12-22
Payer: MEDICARE

## 2020-12-22 VITALS
HEART RATE: 84 BPM | RESPIRATION RATE: 16 BRPM | BODY MASS INDEX: 22.33 KG/M2 | HEIGHT: 65 IN | OXYGEN SATURATION: 94 % | DIASTOLIC BLOOD PRESSURE: 71 MMHG | WEIGHT: 134 LBS | SYSTOLIC BLOOD PRESSURE: 119 MMHG | TEMPERATURE: 97.9 F

## 2020-12-22 DIAGNOSIS — J47.9 BRONCHIECTASIS, UNCOMPLICATED: ICD-10-CM

## 2020-12-22 LAB
BASOPHILS # BLD AUTO: 0.05 K/UL
BASOPHILS NFR BLD AUTO: 0.6 %
EOSINOPHIL # BLD AUTO: 0.05 K/UL
EOSINOPHIL NFR BLD AUTO: 0.6 %
HCT VFR BLD CALC: 35.1 %
HGB BLD-MCNC: 11.1 G/DL
IMM GRANULOCYTES NFR BLD AUTO: 0.4 %
LYMPHOCYTES # BLD AUTO: 0.94 K/UL
LYMPHOCYTES NFR BLD AUTO: 12.1 %
MAN DIFF?: NORMAL
MCHC RBC-ENTMCNC: 28.7 PG
MCHC RBC-ENTMCNC: 31.6 GM/DL
MCV RBC AUTO: 90.7 FL
MONOCYTES # BLD AUTO: 0.45 K/UL
MONOCYTES NFR BLD AUTO: 5.8 %
NEUTROPHILS # BLD AUTO: 6.25 K/UL
NEUTROPHILS NFR BLD AUTO: 80.5 %
PLATELET # BLD AUTO: 198 K/UL
RBC # BLD: 3.87 M/UL
RBC # FLD: 14.2 %
WBC # FLD AUTO: 7.77 K/UL

## 2020-12-22 PROCEDURE — 36415 COLL VENOUS BLD VENIPUNCTURE: CPT

## 2020-12-22 PROCEDURE — 99215 OFFICE O/P EST HI 40 MIN: CPT | Mod: 25

## 2020-12-22 RX ORDER — PREDNISONE 10 MG/1
10 TABLET ORAL DAILY
Qty: 30 | Refills: 3 | Status: ACTIVE | COMMUNITY
Start: 2020-12-22 | End: 1900-01-01

## 2020-12-22 RX ORDER — IPRATROPIUM BROMIDE AND ALBUTEROL SULFATE 2.5; .5 MG/3ML; MG/3ML
0.5-2.5 (3) SOLUTION RESPIRATORY (INHALATION) 4 TIMES DAILY
Qty: 120 | Refills: 4 | Status: ACTIVE | COMMUNITY
Start: 2020-12-22 | End: 1900-01-01

## 2020-12-22 NOTE — HISTORY OF PRESENT ILLNESS
[TextBox_4] : This letter  is regarding your patient  who  attended pulmonary out patient office today.  I have reviewed  patient's  past history, social history, family history and medication list. I also  reviewed nurse practitioners/ and fellows  notes and assessment and agree with it.  \par The patient was referred by  for pulm htn\par \par 69 yo with h/o Aortic and mitral valve replacement- most recently at [facility] , [ 2014- has been having progressive dyspnea since then. She has been having wt loss as well. c/o intermitt chest pain. \par PMH of hyperchol, HTN\par \par visit translated by pt son who provided history\par \par ------No history of , fever, chills , rigors, chest pain, or hemoptysis. Questionable history of Raynaud's phenomenon. No h/o significant weight loss in last few months. No history of liver dysfunction , collagen vascular disorder or chronic thromboembolic disease. I would classify the patient's dyspnea as WHO  FUNCTIONAL CLASS II--------\par \par ----Echo  date- 7/2020 est pasp= 79mmHg-----\par ----Pft date---9/2020 restrictive defect, decreased dlco------\par ----Ct scan date-2018 ---MEDISTINAL LN 2.2X 1.2 CM IN SIZE,-atelectasis , small bilat pleural effusions and pleural thickening, cholelithiasis--\par ---ASLO FINDINGS SUGGESTIVE OF CIRRHOSIS-----ANOTHER LOW ATTENUATION  FOCUS IN PANCREATIC BODY \par \par ---Cath date-----2014----LEFT SIDED CATH---\par \par NOV 2020--KAROL BETTER ON PREDNISONE, NEEDS RIGHT HEART CATH DR NADINE FUENTES, for t mediastinal lymph node needs to see Dr. SEQUEIRA for evidence of cirrhosis she needs to follow-up with  HEPATOLOGY ------

## 2020-12-22 NOTE — DISCUSSION/SUMMARY
[FreeTextEntry1] : ---Assessment plan----------The patient has been referred here for further opinion regarding pulmonary problem,\par 71 yo referred for pulm htn h/o valvular heart disease Group II AND iii   \par CT c/w bronchiectasis\par \par 1) mediastinal lymphadenopathy- referred to Dr Zapata ? biopsy \par 2) pulm htn on echo- referred to Dr Tania Austin for cardiac cath\par 3) ? cirrhosis and pancreatic cyst- referred to Dr Downing hepatology\par 4) bone density\par 5) she is up to date with influenza vac\par 6) labs drawn in our office today\par 7) bronchiectasis- continue prednisone 10mg   ADD NEBULIZER \par 8) serratia in sputum- s/p levaquin- repeat sputum c/s---ADVISED TO F/U WITH  DR EASTON\par 9) f/u in 6-8 weeks with EOT\par \par Thanks for allowing  me to participate  in the care of this patient.  Patient at this time  will follow  the above mentioned recommendations and return back for follow up visit. If you have any questions  I can be reached  at #[phone] (beeper)  or  [phone] (office).\par \par Norma Flowers MD, FCCP \par Director, Pulmonary Hypertension Program \par [facility] \par Division of Pulmonary, Critical Care and Sleep Medicine \par  Professor of Medicine \par [employer]\par

## 2020-12-22 NOTE — DISCUSSION/SUMMARY
[FreeTextEntry1] : ---Assessment plan----------The patient has been referred here for further opinion regarding pulmonary problem,\par 70 yo referred for pulm htn h/o valvular heart disease Group IIAND iii   \par CT c/w bronchiectasis\par \par 1) mediastinal lymphadenopathy- referred to Dr Zapata ? biopsy \par 2) pulm htn on echo- referred to Dr Tania Austin for cardiac cath\par 3) ? cirrhosis and pancreatic cyst- referred to Dr Downing hepatology\par 4) bone density\par 5) she is up to date with influenza vac\par 6) labs drawn in our office today\par 7) bronchiectasis- continue prednisone 10mg \par 8) serratia in sputum- s/p levaquin- repeat sputum c/s\par 9) f/u in 6-8 weeks with EOT\par \par Thanks for allowing  me to participate  in the care of this patient.  Patient at this time  will follow  the above mentioned recommendations and return back for follow up visit. If you have any questions  I can be reached  at #[phone] (beeper)  or  [phone] (office).\par \par Norma Flowers MD, FCCP \par Director, Pulmonary Hypertension Program \par [facility] \par Division of Pulmonary, Critical Care and Sleep Medicine \par  Professor of Medicine \par [employer]\par

## 2020-12-22 NOTE — HISTORY OF PRESENT ILLNESS
[TextBox_4] : This letter  is regarding your patient  who  attended pulmonary out patient office today.  I have reviewed  patient's  past history, social history, family history and medication list. I also  reviewed nurse practitioners/ and fellows  notes and assessment and agree with it.  \par The patient was referred by  for pulm htn\par \par 68 yo with h/o Aortic and mitral valve replacement- most recently at [facility] , [ 2014- has been having progressive dyspnea since then. She has been having wt loss as well. c/o intermitt chest pain. \par PMH of hyperchol, HTN\par \par visit translated by pt son who provided history\par \par ------No history of , fever, chills , rigors, chest pain, or hemoptysis. Questionable history of Raynaud's phenomenon. No h/o significant weight loss in last few months. No history of liver dysfunction , collagen vascular disorder or chronic thromboembolic disease. I would classify the patient's dyspnea as WHO  FUNCTIONAL CLASS II--------\par \par ----Echo  date- 7/2020 est pasp= 79mmHg-----\par ----Pft date---9/2020 restrictive defect, decreased dlco------\par ----Ct scan date-2018 ---MEDISTINAL LN 2.2X 1.2 CM IN SIZE,-atelectasis , small bilat pleural effusions and pleural thickening, cholelithiasis--\par ---ASLO FINDINGS SUGGESTIVE OF CIRRHOSIS-----ANOTHER LOW ATTENUATION  FOCUS IN PANCREATIC BODY \par \par ---Cath date-----2014----LEFT SIDED CATH---\par \par NOV 2020--KAROL BETTER ON PREDNISONE, NEEDS RIGHT HEART CATH DR NADINE FUENTES, for t mediastinal lymph node needs to see Dr. SEQUEIRA for evidence of cirrhosis she needs to follow-up with  HEPATOLOGY ------

## 2020-12-23 LAB
ALBUMIN SERPL ELPH-MCNC: 4 G/DL
ALP BLD-CCNC: 69 U/L
ALT SERPL-CCNC: 25 U/L
ANION GAP SERPL CALC-SCNC: 11 MMOL/L
AST SERPL-CCNC: 28 U/L
BILIRUB SERPL-MCNC: 0.8 MG/DL
BUN SERPL-MCNC: 12 MG/DL
CALCIUM SERPL-MCNC: 8.8 MG/DL
CHLORIDE SERPL-SCNC: 106 MMOL/L
CO2 SERPL-SCNC: 23 MMOL/L
CREAT SERPL-MCNC: 0.6 MG/DL
GLUCOSE SERPL-MCNC: 116 MG/DL
POTASSIUM SERPL-SCNC: 3.9 MMOL/L
PROT SERPL-MCNC: 6.9 G/DL
SODIUM SERPL-SCNC: 140 MMOL/L

## 2020-12-30 LAB
BACTERIA SPT CULT: ABNORMAL
BACTERIA SPT CULT: NORMAL

## 2021-01-19 ENCOUNTER — APPOINTMENT (OUTPATIENT)
Dept: HEPATOLOGY | Facility: CLINIC | Age: 71
End: 2021-01-19
Payer: MEDICARE

## 2021-01-19 VITALS
HEIGHT: 65 IN | DIASTOLIC BLOOD PRESSURE: 60 MMHG | HEART RATE: 90 BPM | RESPIRATION RATE: 16 BRPM | TEMPERATURE: 97.6 F | WEIGHT: 130 LBS | SYSTOLIC BLOOD PRESSURE: 136 MMHG | BODY MASS INDEX: 21.66 KG/M2

## 2021-01-19 DIAGNOSIS — K86.9 DISEASE OF PANCREAS, UNSPECIFIED: ICD-10-CM

## 2021-01-19 PROCEDURE — 99204 OFFICE O/P NEW MOD 45 MIN: CPT

## 2021-01-19 NOTE — CONSULT LETTER
[Dear  ___] : Dear  [unfilled], [Courtesy Letter:] : I had the pleasure of seeing your patient, [unfilled], in my office today. [( Thank you for referring [unfilled] for consultation for _____ )] : Thank you for referring [unfilled] for consultation for [unfilled] [Please see my note below.] : Please see my note below. [FreeTextEntry2] : Dr. Norma Flowers [FreeTextEntry3] : Sincerely,\par \par Radha Downing M.D., Ph.D.\par Director, Women's Liver Health Program, [facility]\par Transplant Hepatology, [facility]\par  of Medicine\par [employer] [facility] [employer] [facility]\par [address]\[place] [address]\par Tel: [phone]\par Fax: [phone]\par Cell: [phone]\par E-mail: [email or web address]\par

## 2021-01-19 NOTE — REASON FOR VISIT
[Initial Evaluation] : an initial evaluation [Other: _____] : [unfilled] [[employer] ] : provided by [employer]   [FreeTextEntry1] : [id number] [FreeTextEntry2] : Ronal [TWNoteComboBox1] : Albanian

## 2021-01-19 NOTE — ASSESSMENT
[FreeTextEntry1] : # Abnormal liver imaging:\par - Non-contrast CT (9/30/20) personally reviewed, with questionable and mild lobulation of the hepatic surface, also with prominent left lobe. Findings are stable compared to prior CT (2015).\par - No history of overt hepatic decompensations, no exam findings suspicious for cirrhosis, and normal liver chemistries and synthetic function based on recent labs.\par - CT findings above may be due to congestive hepatopathy in the setting of her severe pulmonary hypertension, but based on her symptomatology (as well as grossly normal RV function on last TTE on 7/30/20), the suspicion for long-standing chronic right heart failure leading to cardiac cirrhosis is overall low.\par - She also has risk factors for possible MCMAHAN cirrhosis, versus less likely "burnt out" autoimmune hepatitis (currently with normal liver enzymes, but with evidence of positive PRATIBHA with 1:320 titer, positive anti-dsDNA, and modestly elevated IgG on prior labs).\par - Additional labs ordered today to rule out chronic HBV or HCV, as well as other rarer causes of chronic liver disease. Although she has concomitant lung disease, prior A1AT was within normal limits.\par - She is unable to undergo MR elastography due to PPM and is higher risk for biopsy due to anticoagulation, therefore FibroScan ordered today for non-invasive staging of hepatic fibrosis. Abdominal US also ordered for complimentary liver imaging / re-evaluation and to rule out HCC (as prior CT was non-contrast study only).\par \par # Pancreatic body lesion: Indeterminate and subcentimeter on non-contrast CT (9/30/20). Pancreatic CT ordered today for surveillance.\par \par She will return for follow-up and FibroScan in 6-8 weeks.

## 2021-01-19 NOTE — HISTORY OF PRESENT ILLNESS
[de-identified] : Ms. Lake is a 69 yo Taj[place] F with HTN, dyslipidemia, history of mechanical AVR and MVR in 1986, history of re-op bioprosthetic AVR, bioprosthetic MVR, and TV repair (2/4/15), history of Afib (s/p DCCV, also with MICHELLE thrombus on ASH), pulmonary hypertension, bronchiectasis, mediastinal lymphadenopathy of unclear etiology, and osteopenia, who was referred by Dr. Norma Flowers for further evaluation due to CT findings concerning for possible cirrhosis as well as an indeterminate 8 mm pancreatic cystic lesion.\par \par She denies any prior history of chronic liver disease or cirrhosis or any known family history of liver problems, pancreatic malignancy, hepatobiliary malignancy, or colorectal malignancy. Her son thinks she last underwent EGD and colonoscopy 2-3 years ago and that both were okay. She denies any history of jaundice, scleral icterus, overt GI bleeding, abdominal distension, paracentesis, or confusion. She had BLE edema after her CT surgery in 2015 that gradually improved and has not been a problem recently. She has exertional dyspnea but feels that has also been better after her recent course of prednisone. Her heaviest lifetime weight was around 130 lbs.\par \par No history of tobacco, alcohol, or illicit drug use. She has two sons and lives with one of them.

## 2021-01-20 LAB
AFP-TM SERPL-MCNC: 2.8 NG/ML
CERULOPLASMIN SERPL-MCNC: 27 MG/DL
FERRITIN SERPL-MCNC: 46 NG/ML
HBV CORE IGG+IGM SER QL: NONREACTIVE
HBV SURFACE AB SERPL IA-ACNC: <3 MIU/ML
HBV SURFACE AG SER QL: NONREACTIVE
HCV AB SER QL: NONREACTIVE
HCV S/CO RATIO: 0.09 S/CO
HEPATITIS A IGG ANTIBODY: REACTIVE
INR PPP: 2.09 RATIO
IRON SATN MFR SERPL: 14 %
IRON SERPL-MCNC: 53 UG/DL
PT BLD: 23.8 SEC
TIBC SERPL-MCNC: 382 UG/DL
UIBC SERPL-MCNC: 329 UG/DL

## 2021-01-21 LAB
MITOCHONDRIA AB SER IF-ACNC: NORMAL
SMOOTH MUSCLE AB SER QL IF: NORMAL

## 2021-01-29 LAB
LYSOSOMAL ACID LIPASE INTERPRETATION: NORMAL
LYSOSOMAL ACID LIPASE: 96 CD:384473389

## 2021-02-17 ENCOUNTER — RESULT REVIEW (OUTPATIENT)
Age: 71
End: 2021-02-17

## 2021-02-17 ENCOUNTER — APPOINTMENT (OUTPATIENT)
Dept: ULTRASOUND IMAGING | Facility: CLINIC | Age: 71
End: 2021-02-17
Payer: MEDICARE

## 2021-02-17 ENCOUNTER — APPOINTMENT (OUTPATIENT)
Dept: CT IMAGING | Facility: CLINIC | Age: 71
End: 2021-02-17
Payer: MEDICARE

## 2021-02-17 ENCOUNTER — OUTPATIENT (OUTPATIENT)
Dept: OUTPATIENT SERVICES | Facility: HOSPITAL | Age: 71
LOS: 1 days | End: 2021-02-17
Payer: MEDICARE

## 2021-02-17 DIAGNOSIS — R93.2 ABNORMAL FINDINGS ON DIAGNOSTIC IMAGING OF LIVER AND BILIARY TRACT: ICD-10-CM

## 2021-02-17 PROCEDURE — 76700 US EXAM ABDOM COMPLETE: CPT

## 2021-02-17 PROCEDURE — 76700 US EXAM ABDOM COMPLETE: CPT | Mod: 26

## 2021-02-17 PROCEDURE — 74150 CT ABDOMEN W/O CONTRAST: CPT | Mod: 26,ME

## 2021-02-17 PROCEDURE — 74150 CT ABDOMEN W/O CONTRAST: CPT

## 2021-02-17 PROCEDURE — G1004: CPT

## 2021-02-18 LAB — ACID FAST STN SPT: ABNORMAL

## 2021-02-19 DIAGNOSIS — R06.00 DYSPNEA, UNSPECIFIED: ICD-10-CM

## 2021-02-19 LAB — ACID FAST STN SPT: ABNORMAL

## 2021-03-04 ENCOUNTER — APPOINTMENT (OUTPATIENT)
Dept: HEPATOLOGY | Facility: CLINIC | Age: 71
End: 2021-03-04
Payer: MEDICARE

## 2021-03-04 VITALS
WEIGHT: 123 LBS | HEART RATE: 93 BPM | BODY MASS INDEX: 20.49 KG/M2 | HEIGHT: 65 IN | TEMPERATURE: 97.9 F | SYSTOLIC BLOOD PRESSURE: 136 MMHG | RESPIRATION RATE: 16 BRPM | DIASTOLIC BLOOD PRESSURE: 79 MMHG

## 2021-03-04 DIAGNOSIS — K74.00 HEPATIC FIBROSIS, UNSPECIFIED: ICD-10-CM

## 2021-03-04 DIAGNOSIS — R93.2 ABNORMAL FINDINGS ON DIAGNOSTIC IMAGING OF LIVER AND BILIARY TRACT: ICD-10-CM

## 2021-03-04 PROCEDURE — 91200 LIVER ELASTOGRAPHY: CPT

## 2021-03-04 PROCEDURE — 99214 OFFICE O/P EST MOD 30 MIN: CPT

## 2021-03-05 PROBLEM — K74.00 HEPATIC FIBROSIS: Status: ACTIVE | Noted: 2021-03-04

## 2021-03-05 NOTE — HISTORY OF PRESENT ILLNESS
[de-identified] : Ms. Lake is a 69 yo Wol[place] F with HTN, dyslipidemia, history of mechanical AVR and MVR in 1986, history of re-op bioprosthetic AVR, bioprosthetic MVR, and TV repair (2/4/15), history of Afib (s/p DCCV, also with MICHELLE thrombus on ASH), pulmonary hypertension, bronchiectasis, mediastinal lymphadenopathy of unclear etiology, and osteopenia, who is being seen for further evaluation of abnormal liver diagnostic imaging concerning for possible cirrhosis, as well as an 8 mm pancreatic cystic lesion. She was previously referred by Dr. Norma Flowers. She is accompanied today again by her son, and opted for him to translate for her today, declining a formal phone .\par \par She was last seen by me on 1/19/21. She subsequently underwent additional hepatic and pancreatic imaging as follows:\par \par Abdominal US (2/17/21): Diffusely heterogeneous hepatic echotexture without focal mass, with nodular surface contour of the left hepatic lobe, consistent with cirrhosis. Normal spleen. No ascites. Cholelithiasis. 8 mm pancreatic neck cyst.\par \par Non-contrast CT abdomen (2/17/21): Atrophy of right hepatic lobe consistent with cirrhosis. Stable left hepatic cyst. Normal spleen. No ascites. Stable 0.8 cm hypodensity at pancreas neck with no PD dilatation. When compared to US performed on same day, it appears to represent an 0.8 cm cyst.\par \par She underwent FibroScan in our office today that showed median liver stiffness of 10.1 kPA (consistent with F3 fibrosis) and CAP score of 211 dB/m (consistent with S0 = no steatosis).\par \par Today, she reports feeling well, with no jaundice, scleral icterus, overt GI bleeding, abdominal distension, paracentesis, confusion, or BLE edema. She still has some exertional dyspnea.\par \par No history of tobacco, alcohol, or illicit drug use. She has two sons and lives with one of them.

## 2021-03-05 NOTE — REASON FOR VISIT
[Follow-Up: _____] : a [unfilled] follow-up visit [Other: _____] : [unfilled] [Patient Declined  Services] : - None: Patient declined  services [TWNoteComboBox1] : Slovenian

## 2021-03-05 NOTE — ASSESSMENT
[FreeTextEntry1] : # Abnormal liver diagnostic imaging, with concern for cirrhosis:\par - Liver morphology on CT and US with nodular surface and relative right hepatic lobe atrophy and prominent of left hepatic lobe, suspicious for cirrhosis but differential diagnosis also includes congestive hepatopathy.\par - FibroScan today with elevated hepatic stiffness consistent with advanced (F3) hepatic fibrosis, but not quite in range expected for cirrhosis, and also can be elevated in the setting of possible passive congestion of the liver.\par - Cannot exclude the possibility of cirrhosis, and risks of liver biopsy for definitive staging currently outweigh benefits. In terms of etiology of cirrhosis, she has risk factors for possible MCMAHAN cirrhosis, versus less likely "burnt out" autoimmune hepatitis (currently with normal liver enzymes, but with evidence of positive PRATIBHA with 1:320 titer, positive anti-dsDNA, and modestly elevated IgG on prior labs).\par - Assuming she does have cirrhosis, she currently has Child-Anne class A cirrhosis without prior overt hepatic decompensations and with preserved liver synthetic function (apart from INR elevated due to anticoagulation).\par - No history of upper GI varices on prior EGD, and she is currently on carvedilol which should afford her some primary prophylaxis against variceal hemorrhage if she has developed varices since her last EGD.\par - No evidence of ascites on recent imaging.\par - No evidence of HCC or PVT on recent imaging. We discussed continuing with q6 month surveillance for HCC given concern for advanced hepatic fibrosis, with US next due in 8/2021 and ordered today.\par - We discussed potential risks of development of ascites, SBP, varices / variceal hemorrhage, hepatic encephalopathy, PVT, and HCC and alert signs for her to seek medical attention.\par \par # Pancreatic body lesion: Subcentimeter cyst on recent CT and US, without any concerning radiologic features. Will continue with routine surveillance with sonography as above, with plan for repeat CT only if there is sonographic evidence of growth or other concerning features.\par \par # Health maintenance:\par - Ms. SANTACRUZ was counseled to: abstain from alcohol and all illicit drugs; avoid use of herbal and dietary supplements due to potential hepatotoxicity; limit use of acetaminophen to <2 grams per day; avoid use of nonsteroidal antiinflammatory drugs (NSAIDs) as these can lead to diuretic resistance and precipitate renal dysfunction in patients with advanced liver disease; avoid eating any unpasteurized dairy products; avoid eating any raw or undercooked eggs, fish, poultry, or meat; and avoid eating raw/steamed oysters or other shellfish to avoid risk of Vibrio infection.\par - HAV immune.\par - HBV non-immune.\par - I advised COVID-19 vaccination and appropriate precautions.\par \par She will return for follow-up in 6 months.

## 2021-03-05 NOTE — CONSULT LETTER
[Dear  ___] : Dear  [unfilled], [Courtesy Letter:] : I had the pleasure of seeing your patient, [unfilled], in my office today. [Please see my note below.] : Please see my note below. [FreeTextEntry2] : Dr. Norma Flowers [FreeTextEntry3] : Sincerely,\par \par Radha Downing M.D., Ph.D.\par Director, Women's Liver Health Program, [facility]\par Transplant Hepatology, [facility]\par  of Medicine\par [employer] [facility]\par [address]\[place] [address]\par Tel: [phone]\par Fax: [phone]\par Cell: [phone]\par E-mail: [email or web address]\par

## 2021-04-02 ENCOUNTER — RX RENEWAL (OUTPATIENT)
Age: 71
End: 2021-04-02

## 2021-04-05 RX ORDER — NEBULIZER
EACH MISCELLANEOUS
Qty: 2 | Refills: 0 | Status: ACTIVE | COMMUNITY
Start: 2021-04-02 | End: 1900-01-01

## 2021-04-14 ENCOUNTER — APPOINTMENT (OUTPATIENT)
Dept: CARDIOLOGY | Facility: CLINIC | Age: 71
End: 2021-04-14
Payer: MEDICARE

## 2021-04-14 VITALS
HEIGHT: 65 IN | RESPIRATION RATE: 12 BRPM | SYSTOLIC BLOOD PRESSURE: 139 MMHG | DIASTOLIC BLOOD PRESSURE: 74 MMHG | HEART RATE: 75 BPM | WEIGHT: 125 LBS | BODY MASS INDEX: 20.83 KG/M2

## 2021-04-14 PROCEDURE — 93000 ELECTROCARDIOGRAM COMPLETE: CPT

## 2021-04-14 PROCEDURE — 99214 OFFICE O/P EST MOD 30 MIN: CPT

## 2021-04-14 NOTE — DISCUSSION/SUMMARY
[FreeTextEntry1] : 70 F HTN MVR AVR s/p reop bioprosthetic valve TV ring AF for f/u with ATach AF. \par Continue medications for HTN.\par Continue AC and rate control.\par EP f/u for palpitations AF (in NSR today).\par Continue pulm f/u for pulm HTN.\par

## 2021-04-14 NOTE — HISTORY OF PRESENT ILLNESS
[FreeTextEntry1] : 1. HTN: onmedications. \par 2. Hyperlipidemia: on statin.\par 3. AF s/p DCCV: due to persistent symptoms, she was then scheduled for ablation but was found to have a MICHELLE thrombus on ASH. Patient denies CP. She has chronic SOB with exertion. \par SOB that is attributed to underlying AF and diastolic dysfunction. She was seen by Pulm. \par 4. MVR/ AVR: No new changes. \par She had an echo that showed normal valve function with severe pHTN.\par 5. Patient has chronic SOB and is being followed by Pulm. She denies CP.\par She has intermittent palpitations as well. \par \par She received the first dose of her COVID vaccine.\par  \par

## 2021-04-14 NOTE — REASON FOR VISIT
[Follow-Up - Clinic] : a clinic follow-up of [Coronary Artery Disease] : coronary artery disease [Hyperlipidemia] : hyperlipidemia [Hypertension] : hypertension [FreeTextEntry1] : 70 F HTN MVR AVR s/p reop bioprosthetic valve TV ring AF for f/u with ATach AF. \par [phone]\par Dr. Day \par  \par

## 2021-07-07 ENCOUNTER — APPOINTMENT (OUTPATIENT)
Dept: CARDIOLOGY | Facility: CLINIC | Age: 71
End: 2021-07-07
Payer: MEDICARE

## 2021-07-07 VITALS
DIASTOLIC BLOOD PRESSURE: 47 MMHG | BODY MASS INDEX: 20.83 KG/M2 | SYSTOLIC BLOOD PRESSURE: 145 MMHG | HEART RATE: 86 BPM | WEIGHT: 125 LBS | HEIGHT: 65 IN | RESPIRATION RATE: 15 BRPM

## 2021-07-07 PROCEDURE — 99214 OFFICE O/P EST MOD 30 MIN: CPT

## 2021-07-07 PROCEDURE — 93000 ELECTROCARDIOGRAM COMPLETE: CPT

## 2021-07-07 NOTE — PHYSICAL EXAM
[Well Developed] : well developed [Well Nourished] : well nourished [No Acute Distress] : no acute distress [Normal Conjunctiva] : normal conjunctiva [Normal Venous Pressure] : normal venous pressure [No Carotid Bruit] : no carotid bruit [Normal S1, S2] : normal S1, S2 [No Rub] : no rub [No Gallop] : no gallop [Clear Lung Fields] : clear lung fields [Good Air Entry] : good air entry [No Respiratory Distress] : no respiratory distress  [Soft] : abdomen soft [Non Tender] : non-tender [No Masses/organomegaly] : no masses/organomegaly [Normal Bowel Sounds] : normal bowel sounds [Normal Gait] : normal gait [No Edema] : no edema [No Cyanosis] : no cyanosis [No Clubbing] : no clubbing [No Varicosities] : no varicosities [No Rash] : no rash [No Skin Lesions] : no skin lesions [Moves all extremities] : moves all extremities [No Focal Deficits] : no focal deficits [Normal Speech] : normal speech [Alert and Oriented] : alert and oriented [Normal memory] : normal memory [de-identified] : 26 [address] RUSB

## 2021-07-07 NOTE — DISCUSSION/SUMMARY
[FreeTextEntry1] : 70 F HTN MVR AVR s/p reop bioprosthetic valve TV ring AF for f/u with ATach AF. \par Patient has severe pHTN on echo. She declines repeat echo.\par EKG for AF.\par Continue AC for AF.\par Continue medications for HTN.\par Patient received her COVID vaccine.\par

## 2021-07-07 NOTE — HISTORY OF PRESENT ILLNESS
[FreeTextEntry1] :  \par 1. HTN: on medications, compliant.\par 2. Hyperlipidemia: on statin. No muscle pain is noted. \par 3. AF s/p DCCV: due to persistent symptoms, she was then scheduled for ablation but was found to have a MICHELLE thrombus on ASH. SOB that is attributed to underlying AF and diastolic dysfunction. She was seen by Pulm. \par 4. MVR/ AVR: No new changes. She had an echo that showed normal valve function with severe pHTN.\par 5. Patient has chronic SOB and is being followed by Pulm. \par She has chronic SOB. Her ET is limited by SOB.\par \par \par She received the first dose of her COVID vaccine.\par  \par

## 2021-07-07 NOTE — REASON FOR VISIT
[Structural Heart and Valve Disease] : structural heart and valve disease [Hyperlipidemia] : hyperlipidemia [Hypertension] : hypertension [FreeTextEntry1] : 70 F HTN MVR AVR s/p reop bioprosthetic valve TV ring AF for f/u with ATach AF. \par [phone]\par Dr. Day \par  \par

## 2021-09-14 ENCOUNTER — APPOINTMENT (OUTPATIENT)
Dept: HEPATOLOGY | Facility: CLINIC | Age: 71
End: 2021-09-14

## 2021-10-06 ENCOUNTER — APPOINTMENT (OUTPATIENT)
Dept: CARDIOLOGY | Facility: CLINIC | Age: 71
End: 2021-10-06

## 2021-10-13 ENCOUNTER — APPOINTMENT (OUTPATIENT)
Dept: CARDIOLOGY | Facility: CLINIC | Age: 71
End: 2021-10-13
Payer: MEDICARE

## 2021-10-13 VITALS
RESPIRATION RATE: 12 BRPM | DIASTOLIC BLOOD PRESSURE: 68 MMHG | HEIGHT: 65 IN | HEART RATE: 74 BPM | WEIGHT: 124 LBS | BODY MASS INDEX: 20.66 KG/M2 | SYSTOLIC BLOOD PRESSURE: 127 MMHG

## 2021-10-13 PROCEDURE — 99214 OFFICE O/P EST MOD 30 MIN: CPT

## 2021-10-13 RX ORDER — METOPROLOL SUCCINATE 25 MG/1
25 TABLET, EXTENDED RELEASE ORAL
Qty: 30 | Refills: 0 | Status: DISCONTINUED | COMMUNITY
Start: 2017-05-16 | End: 2021-10-13

## 2021-10-13 RX ORDER — DILTIAZEM HYDROCHLORIDE 360 MG/1
360 CAPSULE, EXTENDED RELEASE ORAL
Refills: 0 | Status: ACTIVE | COMMUNITY
Start: 2021-10-13

## 2021-10-13 NOTE — REASON FOR VISIT
[Structural Heart and Valve Disease] : structural heart and valve disease [Hyperlipidemia] : hyperlipidemia [Hypertension] : hypertension [Coronary Artery Disease] : coronary artery disease [FreeTextEntry1] : 70 F HTN MVR AVR s/p reop bioprosthetic valve TV ring AF for f/u with ATach AF. \par [phone]\par Dr. Day \par

## 2021-10-13 NOTE — PHYSICAL EXAM
[Well Developed] : well developed [Well Nourished] : well nourished [No Acute Distress] : no acute distress [Normal Conjunctiva] : normal conjunctiva [Normal Venous Pressure] : normal venous pressure [No Carotid Bruit] : no carotid bruit [Normal S1, S2] : normal S1, S2 [No Rub] : no rub [No Gallop] : no gallop [Clear Lung Fields] : clear lung fields [Good Air Entry] : good air entry [No Respiratory Distress] : no respiratory distress  [Soft] : abdomen soft [Non Tender] : non-tender [No Masses/organomegaly] : no masses/organomegaly [Normal Bowel Sounds] : normal bowel sounds [Normal Gait] : normal gait [No Edema] : no edema [No Cyanosis] : no cyanosis [No Clubbing] : no clubbing [No Varicosities] : no varicosities [No Rash] : no rash [No Skin Lesions] : no skin lesions [Moves all extremities] : moves all extremities [No Focal Deficits] : no focal deficits [Normal Speech] : normal speech [Alert and Oriented] : alert and oriented [Normal memory] : normal memory [de-identified] : 26 [address] RUSB

## 2021-10-13 NOTE — DISCUSSION/SUMMARY
[FreeTextEntry1] : 70 F HTN MVR AVR s/p reop bioprosthetic valve TV ring AF for f/u with ATach AF. \par Patient was recently admitted for CHF. She stopped taking cardizem.\par Will increase coreg to 25 BID.\par Continue lasix.\par Continue AC for AF. Patient has INR f/u with PCP.\par Continue medications for HTN.\par  \par Patient received her COVID vaccine.\par

## 2021-10-13 NOTE — HISTORY OF PRESENT ILLNESS
[FreeTextEntry1] :  \par  \par 1. HTN: on medications, controlled. \par 2. Hyperlipidemia: on statin. No SE noted.\par 3. AF s/p DCCV: due to persistent symptoms, she was then scheduled for ablation but was found to have a MICHELLE thrombus on ASH. SOB that is attributed to underlying AF and diastolic dysfunction. She was seen by Pulm. \par 4. MVR/ AVR: No new changes. She had an echo that showed normal valve function with severe pHTN.\par 5. She was recently admitted to [facility] for CHF exacerbation.\par \par She is on coumadin.\par She is on lasix every other day.\par Her SOB has improved.\par \par She denies cough or fevers.\par \par She received her COVID vaccine.\par

## 2021-11-10 ENCOUNTER — APPOINTMENT (OUTPATIENT)
Dept: CARDIOLOGY | Facility: CLINIC | Age: 71
End: 2021-11-10

## 2024-12-23 ENCOUNTER — APPOINTMENT (OUTPATIENT)
Dept: CARDIOLOGY | Facility: CLINIC | Age: 74
End: 2024-12-23

## 2024-12-26 ENCOUNTER — APPOINTMENT (OUTPATIENT)
Dept: CARDIOLOGY | Facility: CLINIC | Age: 74
End: 2024-12-26
Payer: MEDICARE

## 2024-12-26 VITALS
HEIGHT: 65 IN | BODY MASS INDEX: 16.33 KG/M2 | HEART RATE: 76 BPM | DIASTOLIC BLOOD PRESSURE: 71 MMHG | WEIGHT: 98 LBS | SYSTOLIC BLOOD PRESSURE: 108 MMHG | RESPIRATION RATE: 15 BRPM | OXYGEN SATURATION: 95 %

## 2024-12-26 DIAGNOSIS — Z95.2 PRESENCE OF PROSTHETIC HEART VALVE: ICD-10-CM

## 2024-12-26 PROCEDURE — 99205 OFFICE O/P NEW HI 60 MIN: CPT

## 2024-12-26 PROCEDURE — G2211 COMPLEX E/M VISIT ADD ON: CPT

## 2024-12-26 PROCEDURE — 93000 ELECTROCARDIOGRAM COMPLETE: CPT

## 2024-12-26 RX ORDER — ASPIRIN 81 MG/1
81 TABLET, COATED ORAL
Qty: 90 | Refills: 3 | Status: ACTIVE | COMMUNITY
Start: 2024-12-26 | End: 1900-01-01

## 2025-01-06 ENCOUNTER — APPOINTMENT (OUTPATIENT)
Dept: CARDIOLOGY | Facility: CLINIC | Age: 75
End: 2025-01-06
Payer: MEDICARE

## 2025-01-06 VITALS — SYSTOLIC BLOOD PRESSURE: 123 MMHG | DIASTOLIC BLOOD PRESSURE: 71 MMHG

## 2025-01-06 PROCEDURE — 93306 TTE W/DOPPLER COMPLETE: CPT

## 2025-03-17 ENCOUNTER — APPOINTMENT (OUTPATIENT)
Dept: CARDIOLOGY | Facility: CLINIC | Age: 75
End: 2025-03-17
Payer: MEDICARE

## 2025-03-17 VITALS
HEART RATE: 82 BPM | OXYGEN SATURATION: 93 % | SYSTOLIC BLOOD PRESSURE: 135 MMHG | DIASTOLIC BLOOD PRESSURE: 67 MMHG | RESPIRATION RATE: 18 BRPM

## 2025-03-17 PROCEDURE — G2211 COMPLEX E/M VISIT ADD ON: CPT

## 2025-03-17 PROCEDURE — 99215 OFFICE O/P EST HI 40 MIN: CPT

## 2025-04-18 ENCOUNTER — NON-APPOINTMENT (OUTPATIENT)
Age: 75
End: 2025-04-18

## 2025-05-19 ENCOUNTER — APPOINTMENT (OUTPATIENT)
Dept: CARDIOLOGY | Facility: CLINIC | Age: 75
End: 2025-05-19